# Patient Record
Sex: MALE | Race: WHITE
[De-identification: names, ages, dates, MRNs, and addresses within clinical notes are randomized per-mention and may not be internally consistent; named-entity substitution may affect disease eponyms.]

---

## 2019-01-28 ENCOUNTER — HOSPITAL ENCOUNTER (EMERGENCY)
Dept: HOSPITAL 97 - ER | Age: 54
Discharge: HOME | End: 2019-01-28
Payer: COMMERCIAL

## 2019-01-28 DIAGNOSIS — M54.5: ICD-10-CM

## 2019-01-28 DIAGNOSIS — S40.022A: Primary | ICD-10-CM

## 2019-01-28 PROCEDURE — 99281 EMR DPT VST MAYX REQ PHY/QHP: CPT
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
